# Patient Record
Sex: MALE | Race: WHITE | Employment: FULL TIME | URBAN - METROPOLITAN AREA
[De-identification: names, ages, dates, MRNs, and addresses within clinical notes are randomized per-mention and may not be internally consistent; named-entity substitution may affect disease eponyms.]

---

## 2017-12-05 ENCOUNTER — TELEPHONE (OUTPATIENT)
Dept: FAMILY MEDICINE | Facility: CLINIC | Age: 42
End: 2017-12-05

## 2017-12-05 NOTE — TELEPHONE ENCOUNTER
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  He has lost the card with the information, and would appreciate us asking Dr Rush for this information.

## 2019-12-08 ENCOUNTER — HEALTH MAINTENANCE LETTER (OUTPATIENT)
Age: 44
End: 2019-12-08

## 2020-11-19 ENCOUNTER — VIRTUAL VISIT (OUTPATIENT)
Dept: URGENT CARE | Facility: CLINIC | Age: 45
End: 2020-11-19
Payer: COMMERCIAL

## 2020-11-19 DIAGNOSIS — R05.9 COUGH: Primary | ICD-10-CM

## 2020-11-19 PROCEDURE — 99203 OFFICE O/P NEW LOW 30 MIN: CPT | Mod: 95 | Performed by: NURSE PRACTITIONER

## 2020-11-19 NOTE — PROGRESS NOTES
SUBJECTIVE:   Juan Weaver is a 45 year old male presenting with a chief complaint of loss of smell, dry cough, chills.   Onset of symptoms was 2 days ago.  Course of illness is same.    Severity mild  Treatment measures tried include Tylenol/Ibuprofen, Fluids and Rest.  Predisposing factors include None.  No known exposure  Denies sob chest pain wheezing  Is hydrated.     Past Medical History:   Diagnosis Date     NO ACTIVE PROBLEMS      Current Outpatient Medications   Medication Sig Dispense Refill     CENTRUM OR TABS 1 TABLET DAILY 30 0     Social History     Tobacco Use     Smoking status: Never Smoker     Smokeless tobacco: Former User   Substance Use Topics     Alcohol use: Yes     Alcohol/week: 0.0 standard drinks     Comment: 1-2 drinks/week       ROS:  CONSTITUTIONAL:POSITIVE  for chills and fatigue  INTEGUMENTARY/SKIN: NEGATIVE for worrisome rashes, moles or lesions  EYES: NEGATIVE for vision changes or irritation  ENT/MOUTH: NEGATIVE for ear, mouth and throat problems  RESP:POSITIVE for cough-non productive    OBJECTIVE:    GENERAL APPEARANCE: alert and no distress  RESP: lungs clear   CV: regular rates and rhythm  SKIN: no suspicious lesions or rashes    ASSESSMENT:  (R05) Cough  (primary encounter diagnosis)    Plan: Symptomatic COVID-19 Virus (Coronavirus) by PCR  Isolate 10 days  Home treat and monitor symtpoms  Call if symptoms new or worsening      Telephone time spent 11 minutes    INGRID Shah CNP

## 2023-09-27 ENCOUNTER — DOCUMENTATION ONLY (OUTPATIENT)
Dept: OTHER | Facility: CLINIC | Age: 48
End: 2023-09-27
Payer: COMMERCIAL